# Patient Record
Sex: FEMALE | Race: WHITE | NOT HISPANIC OR LATINO
[De-identification: names, ages, dates, MRNs, and addresses within clinical notes are randomized per-mention and may not be internally consistent; named-entity substitution may affect disease eponyms.]

---

## 2019-02-27 ENCOUNTER — APPOINTMENT (OUTPATIENT)
Dept: ORTHOPEDIC SURGERY | Facility: CLINIC | Age: 84
End: 2019-02-27
Payer: MEDICARE

## 2019-02-27 VITALS
HEART RATE: 84 BPM | HEIGHT: 67 IN | WEIGHT: 170 LBS | BODY MASS INDEX: 26.68 KG/M2 | DIASTOLIC BLOOD PRESSURE: 84 MMHG | SYSTOLIC BLOOD PRESSURE: 150 MMHG

## 2019-02-27 DIAGNOSIS — M54.16 RADICULOPATHY, LUMBAR REGION: ICD-10-CM

## 2019-02-27 PROCEDURE — 99204 OFFICE O/P NEW MOD 45 MIN: CPT

## 2019-02-27 PROCEDURE — 73502 X-RAY EXAM HIP UNI 2-3 VIEWS: CPT

## 2019-02-27 PROCEDURE — 72100 X-RAY EXAM L-S SPINE 2/3 VWS: CPT

## 2019-02-27 NOTE — PHYSICAL EXAM
[de-identified] : Patient is well nourished, well-developed, in no acute distress, with appropriate mood and affect. The patient is oriented to time, place, and person. Respirations are even and unlabored. Gait evaluation does not reveal a limp. There is no inguinal adenopathy. Examination of the contralateral hip shows normal range of motion, strength, no tenderness, and intact skin. The affected limb is well-perfused and showed 2+ dp/pt pulses, without skin lesions, shows a grossly normal motor and sensory examination. Examination of the hip shows no skin lesions. Hip motion is full and painless from 0-90 degrees extension to flexion, 20 degrees adduction and 20 degrees abduction, and 15 degrees internal and 30 degrees external rotation. Leg lengths are approximately equal. FADIR is negative and NEYMAR is negative. Stinchfield test is negative. Both hips are stable and muscle strength is normal with good strength with resisted abduction and adduction. Pedal pulses are palpable.\par Examination of the lumbar spine reveals full range of motion without pain. There is no tenderness to palpation of the osseous structures or paravertebral soft tissues. There is no muscle spasm. Straight leg raise is negative bilaterally. [de-identified] : AP and lateral x-ray of the lumbosacral spine were ordered and obtained in the office and reviewed by me and demonstrate severe degenerative disc disease at multiple levels throughout the lumbar spine with a scoliosis deformity.  No evidence of fracture.\par AP and lateral x-rays of the right hip, pelvis, and femur were ordered and taken in the office and demonstrate no evidence of degenerative joint disease of the hip with maintained joint space and no evidence of fractures or other intraarticular pathology.

## 2019-02-27 NOTE — DISCUSSION/SUMMARY
[de-identified] : This patient has severe spinal stenosis and degenerative disc disease.  Recommend that she see a spine surgeon within our group.  Her right hip is not the etiology of her pain and does not require total hip arthroplasty.  Follow-up PRN.

## 2019-02-27 NOTE — HISTORY OF PRESENT ILLNESS
[de-identified] : This is very nice 83-year-old female experiencing right low back and right lower extremity pain, which is severe in intensity.  She denies groin pain.  The pain substantially limits activities of daily living. Walking tolerance is reduced.  NSAIDs and Tylenol do not help to improve the pain.  She has tried chiropractor, acupuncture, massage which does not help.  She does not use a cane or walker.  The pain does radiate down the leg.  She denies any numbness or tingling or weakness.  No bowel or bladder incontinence.  She has not tried physical therapy for this.  She has never seen a spine surgeon.

## 2019-02-28 ENCOUNTER — TRANSCRIPTION ENCOUNTER (OUTPATIENT)
Age: 84
End: 2019-02-28

## 2019-03-07 ENCOUNTER — APPOINTMENT (OUTPATIENT)
Dept: ORTHOPEDIC SURGERY | Facility: CLINIC | Age: 84
End: 2019-03-07
Payer: MEDICARE

## 2019-03-07 VITALS
HEART RATE: 99 BPM | BODY MASS INDEX: 26.68 KG/M2 | DIASTOLIC BLOOD PRESSURE: 84 MMHG | SYSTOLIC BLOOD PRESSURE: 154 MMHG | HEIGHT: 67 IN | WEIGHT: 170 LBS

## 2019-03-07 DIAGNOSIS — M41.9 SCOLIOSIS, UNSPECIFIED: ICD-10-CM

## 2019-03-07 PROCEDURE — 99215 OFFICE O/P EST HI 40 MIN: CPT

## 2019-03-07 NOTE — PHYSICAL EXAM
[UE/LE] : Sensory: Intact in bilateral upper & lower extremities [ALL] : Biceps, brachioradialis, triceps, patellar, ankle and plantar 2+ and symmetric bilaterally [Normal] : No costovertebral angle tenderness and no spinal tenderness [Rodrigez's Sign] : negative Rodrigez's sign [Pronator Drift] : negative pronator drift [SLR] : negative straight leg raise [de-identified] : Lumbar ROM: full, non painful\par NTTP L spine and b/l paraspinals L region. \par Skin intact L spine. No rashes, ulcers, blisters. \par No lymphedema. \par Rapid alternating movements- Intact. \par Full and non-painful ROM RUE, LUE, RLE, and LLE. Skin intact RUE, LUE, RLE, and LLE. No rashes, blisters, ulcers. NTTP RUE, LUE, RLE, and LLE. No evidence of dislocation or subluxation B/L.\par  [de-identified] : NTTP over L Spine\par FROM [de-identified] : Degenerative Scoliosis\par \par \par

## 2019-03-07 NOTE — DISCUSSION/SUMMARY
[de-identified] : 84 yo female with lumbar stenosis and neurogenic claudication, recommend PT, and flexion based exercise program, recommend MRI of L Spine.

## 2019-03-07 NOTE — REASON FOR VISIT
[Initial Visit] : an initial visit for [Back Pain] : back pain [Degenerative Joint Disease] : degenerative joint disease [Radiculopathy] : radiculopathy [FreeTextEntry2] : Recommended for evaluation by Dr Goldberg

## 2019-03-07 NOTE — CONSULT LETTER
[Dear  ___] : Dear  [unfilled], [Consult Letter:] : I had the pleasure of evaluating your patient, [unfilled]. [Please see my note below.] : Please see my note below. [Consult Closing:] : Thank you very much for allowing me to participate in the care of this patient.  If you have any questions, please do not hesitate to contact me. [Sincerely,] : Sincerely, [FreeTextEntry3] : Curly Chowdhury MD

## 2019-03-11 ENCOUNTER — FORM ENCOUNTER (OUTPATIENT)
Age: 84
End: 2019-03-11

## 2019-03-12 ENCOUNTER — OTHER (OUTPATIENT)
Age: 84
End: 2019-03-12

## 2019-03-12 ENCOUNTER — APPOINTMENT (OUTPATIENT)
Dept: MRI IMAGING | Facility: CLINIC | Age: 84
End: 2019-03-12
Payer: MEDICARE

## 2019-03-12 ENCOUNTER — OUTPATIENT (OUTPATIENT)
Dept: OUTPATIENT SERVICES | Facility: HOSPITAL | Age: 84
LOS: 1 days | End: 2019-03-12
Payer: MEDICARE

## 2019-03-12 DIAGNOSIS — Z00.8 ENCOUNTER FOR OTHER GENERAL EXAMINATION: ICD-10-CM

## 2019-03-12 PROCEDURE — 72148 MRI LUMBAR SPINE W/O DYE: CPT

## 2019-03-12 PROCEDURE — 72148 MRI LUMBAR SPINE W/O DYE: CPT | Mod: 26

## 2019-04-30 ENCOUNTER — APPOINTMENT (OUTPATIENT)
Dept: ORTHOPEDIC SURGERY | Facility: CLINIC | Age: 84
End: 2019-04-30
Payer: MEDICARE

## 2019-04-30 DIAGNOSIS — M48.07 SPINAL STENOSIS, LUMBOSACRAL REGION: ICD-10-CM

## 2019-04-30 DIAGNOSIS — M43.16 SPONDYLOLISTHESIS, LUMBAR REGION: ICD-10-CM

## 2019-04-30 PROCEDURE — 99214 OFFICE O/P EST MOD 30 MIN: CPT

## 2019-04-30 NOTE — HISTORY OF PRESENT ILLNESS
[4] : an average pain level of 4/10 [3] : a minimum pain level of 3/10 [6] : a maximum pain level of 6/10 [Constant] : ~He/She~ states the symptoms seem to be constant [Walking] : worsened by walking [Rest] : relieved by rest [de-identified] : Pt with severe lumbar stenosis, DDD and neurogenic claudication,treated with PT, and flexion based exercise program. She presents today for f/u results of lumbar spine MRI. pain radiates to right buttocks, lateral aspect of RLE to knee. She denies numbness,tingling to B/L LE. Pt takes NSAIDs and Tylenol, these provides no relief in pain. She denies any numbness or tingling or weakness to B/L LE. Pt plays tennis during the summer. performs stretching exercises, this provides no relief in pain.  [Bending] : not worsened by bending [Lifting] : not worsened by lifting [Prolonged Sitting] : not worsened by prolonged sitting [Prolonged Standing] : not worsened by prolonged standing [Acetaminophen] : not relieved by acetaminophen [Acupuncture] : not relieved by acupuncture [Chiropractic] : not relieved by chiropractic manipulation [Heat] : not relieved by heat [NSAIDs] : not relieved by nonsteroidal anti-inflammatory drugs [Incontinence] : no incontinence [Urinary Ret.] : no urinary retention

## 2019-04-30 NOTE — PHYSICAL EXAM
[UE/LE] : Sensory: Intact in bilateral upper & lower extremities [ALL] : dorsalis pedis, posterior tibial, femoral, popliteal, and radial 2+ and symmetric bilaterally [Normal] : No costovertebral angle tenderness and no spinal tenderness [Rodrigez's Sign] : negative Rodrigez's sign [Pronator Drift] : negative pronator drift [SLR] : negative straight leg raise [de-identified] : Lumbar ROM: full, non painful\par NTTP L spine and b/l paraspinals L region. \par Skin intact L spine. No rashes, ulcers, blisters. \par No lymphedema. \par Rapid alternating movements- Intact. \par Full and non-painful ROM RUE, LUE, RLE, and LLE. Skin intact RUE, LUE, RLE, and LLE. No rashes, blisters, ulcers. NTTP RUE, LUE, RLE, and LLE. No evidence of dislocation or subluxation B/L.\par  [de-identified] : NTTP over L Spine\par FROM [de-identified] : EXAM: MR SPINE LUMBAR \par \par \par PROCEDURE DATE: 03/12/2019 \par \par \par \par INTERPRETATION: \par LUMBOSACRAL SPINE MRI \par \par CLINICAL INFORMATION: Lumbar stenosis. Lower back pain rating to the right \par side for one year. History of melanoma. \par TECHNIQUE: Multiplanar, multisequence MR imaging was obtained of the \par lumbosacral spine. \par COMPARISON: None available. \par FINDINGS: \par \par DISC LEVEL EVALUATION: \par \par T10/T11: Evaluated only in the sagittal plane. Diffuse disc bulging effacing \par ventral thecal sac. No central canal narrowing. \par T11/T12: Evaluated only in the sagittal plane. Osseous ridging and mild disc \par bulging effacing ventral thecal sac. No central canal narrowing. \par T12/L1: Evaluate only in the sagittal plane. Posterior osseous ridging \par extending into both foramina. There is moderate right and moderate to severe \par left foraminal narrowing. No central canal narrowing. \par L1/L2: Mild posterior osseous ridging effacing ventral thecal sac. No \par central canal narrowing. No foraminal narrowing. Mild bilateral lateral \par recess narrowing. \par L2/L3: Moderate posterior osseous ridging effacing ventral thecal sac and \par mildly contacting the bilateral descending L3 nerve roots. There is no \par central canal narrowing. Mild bilateral foraminal narrowing. \par L3/L4: Moderate to severe right and mild to moderate left facet productive \par change. Posterior osseous ridging effaces the ventral thecal sac. There is \par severe right foraminal narrowing with compression the descending right L4 \par nerve root and mild left lateral recess narrowing. There is mild to moderate \par bilateral foraminal narrowing. \par L4/L5: Severe right and moderate to severe left facet productive change. \par Results in mild anterolisthesis of L4 on L5. Uncovering of the disc space. \par Severe right foraminal narrowing. Mild to moderate left foraminal narrowing. \par No central canal narrowing. Moderate to severe bilateral lateral recess \par narrowing with contact of the descending bilateral nerve roots. \par L5/S1: Severe bilateral facet productive change. Minimal anterolisthesis of \par L5 on S1. No central canal narrowing. There is moderate left foraminal \par osseous ridging which mildly contacts the exited left L5 nerve root. \par \par SPINAL ALIGNMENT: Mild S shaped curvature of the lumbar spine. Varying \par levels of disc space narrowing. Mild anterolisthesis of L4 on L5 and L5 on \par S1. Mild left lateral listhesis of L4 on L5. Mild right lateral listhesis of \par L3 on L4. \par DISTAL CORD AND CONUS: The spinal cord terminates at the T12-L1 level. No \par abnormal signal seen within the conus. \par SI JOINTS: No articular abnormality. \par MARROW: There is a Schmorl's node along the superior plate of L3. A rounded \par focus of increased T1 and T2 signal is noted within the T11 vertebral body \par measuring 1.3 cm in keeping with hemangioma. \par PARASPINAL MUSCLE AND SOFT TISSUES: Symmetric appearance of paraspinal \par musculature demonstrate moderate atrophy. \par INTRAABDOMINAL/INTRAPELVIC SOFT TISSUES: No abnormality is noted. \par \par IMPRESSION: \par \par L4-5: Severe bilateral facet arthropathy. Posterior osseous ridging and \par uncovering the disc space is asymmetric to the right. Contact of bilateral \par descending L5 nerve roots. Moderate to severe right foraminal narrowing. \par Mild to moderate left foraminal narrowing. \par L5-S1: Severe facet productive change. Mild anterolisthesis of L5 on S1. \par Moderate left foraminal osseous ridging that mildly contacts the exited left \par L5 nerve root. \par L2-3: Moderate posterior osseous ridging mildly contacting the bilateral \par descending nerve roots. \par Additional multilevel spondylosis as detailed above. \par \par JORGE REYES M.D., ATTENDING RADIOLOGIST \par This document has been electronically signed. Mar 15 2019 1:31PM \par \par \par 2 views AP and Lat of LS Spine from [id number] . Read and dictated by Dr. Curly Chowdhury Board Certified orthopaedic surgeon: Degenerative Scoliosis\par \par \par

## 2019-04-30 NOTE — DISCUSSION/SUMMARY
[de-identified] : 82 yo female with lumbar stenosis, spondylolisthesis and neurogenic claudication, recommend pain consult. \par \par \par Diagnosis, prognosis, natural history and treatment was discussed with patient. Patient was advised if the following symptoms develop: chills, fever, loss of bladder control, bladder incontinence or urinary retention, numbness/tingling or weakness is present in upper or lower extremities, to go to the nearest ER and to call the office immediately to inform us.\par \par